# Patient Record
(demographics unavailable — no encounter records)

---

## 2025-01-02 NOTE — PHYSICAL EXAM
[Chaperone Present] : A chaperone was present in the examining room during all aspects of the physical examination [Appropriately responsive] : appropriately responsive [Alert] : alert [No Acute Distress] : no acute distress [Oriented x3] : oriented x3 [Examination Of The Breasts] : a normal appearance [No Discharge] : no discharge [No Masses] : no breast masses were palpable [No Lesions] : no lesions  [Labia Majora] : normal [Labia Minora] : normal [Pink Rugae] : pink rugae [No Bleeding] : There was no active vaginal bleeding [Normal] : normal [Normal Position] : in a normal position [Uterine Adnexae] : non-palpable

## 2025-01-03 NOTE — HISTORY OF PRESENT ILLNESS
[Oral Contraceptive] : uses oral contraception pills [Y] : Patient is sexually active [N] : Patient denies prior pregnancies [Menarche Age: ____] : age at menarche was [unfilled] [No] : Patient does not have concerns regarding sex [Currently Active] : currently active [Men] : men [TextBox_31] : TOO YOUNG [GonorrheaDate] : 03/14/24 [TextBox_63] : NEG [ChlamydiaDate] : 03/14/24 [TextBox_68] : NEG [LMPDate] : 12/25/24 [FreeTextEntry1] : 12/25/24

## 2025-03-08 NOTE — HISTORY OF PRESENT ILLNESS
[N] : Patient denies prior pregnancies [Currently Active] : currently active [GonorrheaDate] : 3/14/24 [TextBox_63] : NEG [ChlamydiaDate] : 3/14/24 [TextBox_68] : NEG [LMPDate] : 3/5/25 [PGHxTotal] : 0 [FreeTextEntry1] : 3/5/25

## 2025-06-23 NOTE — END OF VISIT
[FreeTextEntry3] :  I, Kanika Blockghar solely acted as scribe for Dr. Hoa Palacios on 06/20/2025 All medical entries made by the Scribe were at my, Dr. Palacios, direction and personally dictated by me on 06/20/2025 . I have reviewed the chart and agree that the record accurately reflects my personal performance of the history, physical exam, assessment and plan. I have also personally directed, reviewed, and agreed with the chart.

## 2025-06-23 NOTE — HISTORY OF PRESENT ILLNESS
[Y] : Patient is sexually active [N] : Patient denies prior pregnancies [Currently Active] : currently active [Men] : men [GonorrheaDate] : 03/14/2024 [TextBox_63] : NEGATIVE [ChlamydiaDate] : 03/14/2024 [TextBox_68] : NEGATIVE [TrichomonasDate] : 09/25/2024 [TextBox_73] : NEGATIVE [LMPDate] : 05/18/2025 [PGHxTotal] : 0 [FreeTextEntry1] : 05/18/2025

## 2025-06-23 NOTE — DISCUSSION/SUMMARY
[FreeTextEntry1] :  Pain during intercourse. reports deep pain, vaginal discomfort during intercourse. Physical Exam conducted.   Vaginitis and urine culture were collected and sent for analysis.  Hormonal panel collected today.  Recommendation for TVUS and she is agreeable  She verbalized understanding and agreement with above counseling regarding differential diagnosis, evaluation, and plan. She was given time for questions/concerns which were all answered to her apparent satisfaction. All designated lab work for today drawn in office. RTO 1-4w for TVUS.

## 2025-06-23 NOTE — PHYSICAL EXAM
[Appropriately responsive] : appropriately responsive [Alert] : alert [No Acute Distress] : no acute distress [Oriented x3] : oriented x3 [Labia Majora] : normal [Labia Minora] : normal [Normal] : normal [Uterine Adnexae] : normal [MA] : MA [FreeTextEntry2] : Victorina [Adnexa Tenderness] : tender

## 2025-06-26 NOTE — PROCEDURE
[IUD Placement] : intrauterine device (IUD) placement [Time out performed] : Pre-procedure time out performed.  Patient's name, date of birth and procedure confirmed. [Consent Obtained] : Consent obtained [Prevention of Pregnancy] : prevention of pregnancy [Risks] : risks [Benefits] : benefits [Alternatives] : alternatives [Patient] : patient [Infection] : infection [Bleeding] : bleeding [Pain] : pain [Expulsion] : expulsion [Failure] : failure [Uterine Perforation] : uterine perforation [Neg Pregnancy Test] : negative pregnancy test [Neg GC/Chlamydia] : negative GC/Chlamydia [Betadine] : Betadine [Tenaculum] : Tenaculum [Easy Passage] : Easy passage [Sounded to ___ cm] : sounded to [unfilled] ~Ucm [Post Placement Transvag. US] : post placement transvaginal ultrasound [Kyleena IUD] : Kyleena IUD [US Guidance] : US Guidance [Tolerated Well] : Patient tolerated the procedure well [History of Unprotected [place]] : no history of unprotected intercourse [LMPDate] : 5/19/25 [de-identified] : [id number] [de-identified] : 3/2027 [de-identified] : unsuccessful insertion, expelled immediately after placement

## 2025-06-26 NOTE — PLAN
[FreeTextEntry1] : -IUD expelled immediately after placement; fundal upon placement with US guidance and in the cervix upon post-procedure TVUS -She does not want to attempt re-insertion, but still interested in more effective contraception than ZULMA -Will consider Nexplanon; discussed today, additional literature provided -To use ZULMA while considering Nexplanon, call to make apt. if she desires insertion  -Call or RTO PRN for any new problems, questions or concerns

## 2025-06-26 NOTE — PROCEDURE
[IUD Placement] : intrauterine device (IUD) placement [Time out performed] : Pre-procedure time out performed.  Patient's name, date of birth and procedure confirmed. [Consent Obtained] : Consent obtained [Prevention of Pregnancy] : prevention of pregnancy [Risks] : risks [Benefits] : benefits [Alternatives] : alternatives [Patient] : patient [Infection] : infection [Bleeding] : bleeding [Pain] : pain [Expulsion] : expulsion [Failure] : failure [Uterine Perforation] : uterine perforation [Neg Pregnancy Test] : negative pregnancy test [Neg GC/Chlamydia] : negative GC/Chlamydia [Betadine] : Betadine [Tenaculum] : Tenaculum [Easy Passage] : Easy passage [Sounded to ___ cm] : sounded to [unfilled] ~Ucm [Post Placement Transvag. US] : post placement transvaginal ultrasound [Kyleena IUD] : Kyleena IUD [US Guidance] : US Guidance [Tolerated Well] : Patient tolerated the procedure well [History of Unprotected [place]] : no history of unprotected intercourse [LMPDate] : 5/19/25 [de-identified] : [id number] [de-identified] : 3/2027 [de-identified] : unsuccessful insertion, expelled immediately after placement

## 2025-06-26 NOTE — PHYSICAL EXAM
[MA] : MA [Appropriately responsive] : appropriately responsive [Alert] : alert [No Acute Distress] : no acute distress [Oriented x3] : oriented x3 [Labia Majora] : normal [Labia Minora] : normal [No Bleeding] : There was no active vaginal bleeding [Normal] : normal [FreeTextEntry2] : Cam

## 2025-06-26 NOTE — HISTORY OF PRESENT ILLNESS
[Y] : Patient is sexually active [N] : Patient denies prior pregnancies [Currently Active] : currently active [GonorrheaDate] : 3/14/24 [TextBox_63] : Neg [TextBox_68] : Neg [ChlamydiaDate] : 3/14/24 [LMPDate] : 5/18/25 [PGHxTotal] : 0 [FreeTextEntry1] : 5/18/25

## 2025-06-26 NOTE — HISTORY OF PRESENT ILLNESS
[Y] : Patient is sexually active [N] : Patient denies prior pregnancies [Currently Active] : currently active [GonorrheaDate] : 3/14/24 [TextBox_63] : Neg [ChlamydiaDate] : 3/14/24 [TextBox_68] : Neg [LMPDate] : 5/18/25 [PGHxTotal] : 0 [FreeTextEntry1] : 5/18/25